# Patient Record
(demographics unavailable — no encounter records)

---

## 2024-10-30 NOTE — ASSESSMENT
[FreeTextEntry1] : 54M smoker w HLD, low T on TRT, and history of drug-induced AMI who presents for penile papillomas, ED, and Peyronies. Ed is responding well to tadalafil 5 mg/d. His peyronies is not interfering w sex. Penile papillomas resolved w topical Condylox. We agreed to continue tadalafil 5 mg/d.

## 2024-10-30 NOTE — HISTORY OF PRESENT ILLNESS
[FreeTextEntry1] : 54M smoker w HLD, low T on TRT, and history of drug-induced AMI who presents for penile papillomas, ED, and Peyronies.  6/2024, sildenafil helped w erections, but he noticed a 30 degree dorsal curvature w/o pain.  8/28/24, started Condylox for penile condylomas. Started tadalafil 5 mg/d.  Today, he reports the curvature is stable. He is able to have sex, and his wife does not complain about the curvature. His erections are stronger enough w tadalafil 5 mg/d and not having side effects. His papillomas have completely resolved.